# Patient Record
Sex: MALE | Race: BLACK OR AFRICAN AMERICAN | NOT HISPANIC OR LATINO | Employment: UNEMPLOYED | URBAN - METROPOLITAN AREA
[De-identification: names, ages, dates, MRNs, and addresses within clinical notes are randomized per-mention and may not be internally consistent; named-entity substitution may affect disease eponyms.]

---

## 2022-04-12 ENCOUNTER — HOSPITAL ENCOUNTER (EMERGENCY)
Facility: HOSPITAL | Age: 32
Discharge: HOME | End: 2022-04-12
Attending: EMERGENCY MEDICINE

## 2022-04-12 VITALS
SYSTOLIC BLOOD PRESSURE: 131 MMHG | BODY MASS INDEX: 27.09 KG/M2 | TEMPERATURE: 98.8 F | WEIGHT: 200 LBS | HEART RATE: 65 BPM | HEIGHT: 72 IN | RESPIRATION RATE: 15 BRPM | DIASTOLIC BLOOD PRESSURE: 78 MMHG | OXYGEN SATURATION: 99 %

## 2022-04-12 DIAGNOSIS — Z00.8 MEDICAL CLEARANCE FOR INCARCERATION: Primary | ICD-10-CM

## 2022-04-12 PROCEDURE — 99281 EMR DPT VST MAYX REQ PHY/QHP: CPT

## 2022-04-12 NOTE — ED ATTESTATION NOTE
I reviewed and agree with physician assistant / nurse practitioner’s assessment and plan of care.  We discussed the treatment plan for the patient.  I was immediately available for any questions regarding the care of the patient.    We are in a pandemic with increased volumes and decreased capacity.      Sha Doss MD  04/12/22 0898

## 2022-04-12 NOTE — DISCHARGE INSTRUCTIONS
Medically cleared for incarceration    Right wrist is to be kept in the splint.  Can ice it 10 minutes on 10 minutes off as needed.  Can take Motrin or Tylenol as needed for pain  Should follow-up with his orthopedic doctor for further evaluation  Return to the ED at any time for worsening symptoms.
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
## 2023-02-08 PROBLEM — Z00.00 ENCOUNTER FOR PREVENTIVE HEALTH EXAMINATION: Status: ACTIVE | Noted: 2023-02-08

## 2023-02-09 ENCOUNTER — APPOINTMENT (OUTPATIENT)
Dept: ORTHOPEDIC SURGERY | Facility: CLINIC | Age: 33
End: 2023-02-09